# Patient Record
(demographics unavailable — no encounter records)

---

## 2025-07-07 NOTE — REASON FOR VISIT
[Initial Evaluation] : an initial evaluation [FreeTextEntry1] : pt sent by Dr Eladio Beach for assessment of approx 10 years of recurrent and persistent appearance of scattered b/l legs and thighs dark, pigmented scattered superficial and circular lesions involving skin - are tender and slightly red when first appear; Dr Beach wants to rule put venous reflux/venous dermatitis - no other medical problems, takes no medications - has been treated in the distant past with steroid injections but did not help - b/l vle with rvt today shows normal b/l study, no varicose veins and normal intact arterial pulses - appearance looks consistent with possible erythema nodosum - have instructed pt to discuss referral to dermatologist with her PCP Dr Beach - should have biopsy.

## 2025-07-07 NOTE — DATA REVIEWED
[FreeTextEntry1] : b/l vle with rvt - normal venous study both legs, no dvt no svt no truncal reflux

## 2025-07-07 NOTE — PHYSICAL EXAM
[2+] : left 2+ [Ankle Swelling Bilaterally] : bilaterally  [Skin Induration] : induration [Alert] : alert [Oriented to Person] : oriented to person [Oriented to Place] : oriented to place [Oriented to Time] : oriented to time [Calm] : calm [Ankle Swelling (On Exam)] : not present [Varicose Veins Of Lower Extremities] : not present [] : not present [Purpura] : no purpura  [Petechiae] : no petechiae [Skin Ulcer] : no ulcer [de-identified] : wdwn nad [FreeTextEntry1] : scattered circular brownish discolored flat lesions of skin throughout both legs and thighs, no ulcers [de-identified] : wnl [de-identified] : as above

## 2025-07-07 NOTE — ASSESSMENT
[Other: _____] : [unfilled] [FreeTextEntry1] : venous study b/l wnl  recommend referral to dermatology for possible biopsy and evaluate for erythema nodosum  no evidence arterial or venous ds